# Patient Record
Sex: MALE | Race: WHITE | Employment: FULL TIME | URBAN - METROPOLITAN AREA
[De-identification: names, ages, dates, MRNs, and addresses within clinical notes are randomized per-mention and may not be internally consistent; named-entity substitution may affect disease eponyms.]

---

## 2023-03-31 ENCOUNTER — APPOINTMENT (OUTPATIENT)
Dept: GENERAL RADIOLOGY | Age: 38
End: 2023-03-31
Payer: COMMERCIAL

## 2023-03-31 ENCOUNTER — HOSPITAL ENCOUNTER (EMERGENCY)
Age: 38
Discharge: HOME | End: 2023-04-01
Attending: EMERGENCY MEDICINE
Payer: COMMERCIAL

## 2023-03-31 VITALS
BODY MASS INDEX: 40.43 KG/M2 | OXYGEN SATURATION: 99 % | WEIGHT: 315 LBS | DIASTOLIC BLOOD PRESSURE: 126 MMHG | HEIGHT: 74 IN | TEMPERATURE: 98.7 F | HEART RATE: 95 BPM | RESPIRATION RATE: 18 BRPM | SYSTOLIC BLOOD PRESSURE: 156 MMHG

## 2023-03-31 DIAGNOSIS — M25.551 RIGHT HIP PAIN: Primary | ICD-10-CM

## 2023-03-31 PROCEDURE — 99283 EMERGENCY DEPT VISIT LOW MDM: CPT

## 2023-03-31 PROCEDURE — 73502 X-RAY EXAM HIP UNI 2-3 VIEWS: CPT

## 2023-03-31 ASSESSMENT — PAIN DESCRIPTION - PAIN TYPE: TYPE: ACUTE PAIN

## 2023-03-31 ASSESSMENT — PAIN SCALES - GENERAL: PAINLEVEL_OUTOF10: 4

## 2023-03-31 ASSESSMENT — PAIN - FUNCTIONAL ASSESSMENT
PAIN_FUNCTIONAL_ASSESSMENT: 0-10
PAIN_FUNCTIONAL_ASSESSMENT: ACTIVITIES ARE NOT PREVENTED

## 2023-03-31 ASSESSMENT — PAIN DESCRIPTION - LOCATION: LOCATION: HIP

## 2023-03-31 ASSESSMENT — PAIN DESCRIPTION - ORIENTATION: ORIENTATION: RIGHT

## 2023-03-31 ASSESSMENT — PAIN DESCRIPTION - FREQUENCY: FREQUENCY: CONTINUOUS

## 2023-03-31 ASSESSMENT — PAIN DESCRIPTION - ONSET: ONSET: ON-GOING

## 2023-03-31 ASSESSMENT — PAIN DESCRIPTION - DESCRIPTORS: DESCRIPTORS: ACHING

## 2023-03-31 NOTE — ED TRIAGE NOTES
Patient arrived at the ER with c/o  work injury. Patient was at work when he put on boot cover. He slip and fell on concert around 4:48. Patient has right hip pain. CONGESTION/RHINORRHEA

## 2023-03-31 NOTE — Clinical Note
Kalen Hicks was seen and treated in our emergency department on 3/31/2023. He may return to work on 04/05/2023. If you have any questions or concerns, please don't hesitate to call.       Yesy Hodge MD

## 2023-03-31 NOTE — Clinical Note
Pauline Bell was seen and treated in our emergency department on 3/31/2023. He may return to work on 04/05/2023. If you have any questions or concerns, please don't hesitate to call.       Roselia Mcclure MD

## 2023-04-01 ASSESSMENT — ENCOUNTER SYMPTOMS
VOMITING: 0
NAUSEA: 0
ABDOMINAL PAIN: 0
SHORTNESS OF BREATH: 0
COUGH: 0
BACK PAIN: 0

## 2023-04-01 NOTE — DISCHARGE INSTRUCTIONS
You were seen at the [facility] ED for right hip pain after a fall. Your X-ray is normal. You can manage your symptoms at home with medications like Tylenol and Ibuprofen. Please return to the ED if you experience chest pain, trouble breathing, or any other symptoms that concern you.

## 2023-04-01 NOTE — ED NOTES
Patient prepared for and ready to be discharged. Patient discharged at this time in no acute distress after verbalizing understanding of discharge instructions. Patient left after receiving After Visit Summary instructions.       Darleen Sena RN  04/01/23 [id number]

## 2023-04-01 NOTE — ED PROVIDER NOTES
ED Attending Attestation Note     Date of evaluation: 3/31/2023    This patient was seen by the resident. I have seen and examined the patient, agree with the workup, evaluation, management and diagnosis. The care plan has been discussed. My assessment reveals a pleasant 20-year-old male who presents to the emerged department with right hip pain. Patient suffered a mechanical slip and fall at work earlier today. He attributes this to his new mandated foot booties that he has to wear. Patient states he did not strike his head or lose consciousness. He was able to bear weight on this extremity following the fall but he continued to have right hip pain prompting his visit here today. On examination I find an adult male, speaking in complete sentences. He has no increased work of breathing or accessory muscle use during respiration. His abdomen is soft, nontender to palpation. He has mild tenderness palpation over his lateral right hip with no pain with logroll. Neurovascular intact distally. X-rays without any signs of fracture or dislocation. Will discharge with symptomatic management using Tylenol ibuprofen and instructions to follow-up as needed. Pito Gil MD MPH   Physician.         Swapnil Garcia MD  03/31/23 [id number]

## 2023-04-01 NOTE — ED PROVIDER NOTES
.  [address] ENCOUNTER          EM RESIDENT NOTE       Date of evaluation: 3/31/2023    Chief Complaint     Fall, Work Related Injury, and Hip Pain (Right hip)      History of Present Illness     Elisha Baird is a 45 y.o. male who presents with right hip pain following a fall. Patient reports that while he was at work this afternoon, he slipped on a slippery surface, and fell onto his right hip. He was able to stand up and ambulate following his fall without issue, but has been experiencing intermittent discomfort in his right hip since that time. He denies hitting his head or losing consciousness during his fall, or striking any other part of his body on the ground. He reports that his pain is somewhat worsened with ambulation, but does not limit his movement of the right lower extremity. He denies associated numbness, tingling, or weakness in his right lower extremity. He is otherwise in his normal state of health, and denies any preceding chest pain, shortness of breath, dizziness, lightheadedness preceding the fall. MEDICAL DECISION MAKING / ASSESSMENT / PLAN     INITIAL VITALS: BP: (!) 156/126, Temp: 98.7 °F (37.1 °C), Heart Rate: 95, Resp: 18, SpO2: 99 %    Elisha Baird is a 45 y.o. male who presents with right hip pain following a mechanical fall earlier today. Upon presentation, vital signs remarkable for hypertension with a blood pressure of 156/126, but were otherwise within normal limits. On exam, patient is well-appearing. He does not have significant bony tenderness to palpation of the right hip, and no apparent neurovascular deficits of his bilateral lower extremities. Right hip x-ray obtained in triage was unremarkable. Patient symptoms are most likely secondary to soft tissue injury following his fall. Patient was offered prescriptions for Tylenol, ibuprofen, lidocaine patches, but he declined stating that he has these medications at home.   Paperwork was vomiting. Musculoskeletal:  Negative for back pain, joint swelling and neck pain. Skin:  Negative for rash and wound. Neurological:  Negative for dizziness, syncope, weakness, light-headedness, numbness and headaches. Past Medical, Surgical, Family, and Social History     He has no past medical history on file. He has no past surgical history on file. His family history is not on file. He reports that he has been smoking cigarettes. He does not have any smokeless tobacco history on file. He reports current alcohol use. Medications     There are no discharge medications for this patient. Allergies     He has No Known Allergies. Physical Exam     INITIAL VITALS: BP: (!) 156/126, Temp: 98.7 °F (37.1 °C), Heart Rate: 95, Resp: 18, SpO2: 99 %   Physical Exam  Constitutional:       General: He is not in acute distress. HENT:      Head: Normocephalic and atraumatic. Right Ear: External ear normal.      Left Ear: External ear normal.      Nose: Nose normal.      Mouth/Throat:      Mouth: Mucous membranes are moist.   Eyes:      Conjunctiva/sclera: Conjunctivae normal.   Cardiovascular:      Rate and Rhythm: Normal rate and regular rhythm. Pulses: Normal pulses. Heart sounds: Normal heart sounds. Pulmonary:      Effort: Pulmonary effort is normal.      Breath sounds: Normal breath sounds. Abdominal:      General: Abdomen is flat. Palpations: Abdomen is soft. Musculoskeletal:         General: No swelling, tenderness or deformity. Normal range of motion. Cervical back: Neck supple. No rigidity. Skin:     General: Skin is warm and dry. Capillary Refill: Capillary refill takes less than 2 seconds. Neurological:      General: No focal deficit present. Mental Status: He is alert and oriented to person, place, and time.    Psychiatric:         Mood and Affect: Mood normal.         Behavior: Behavior normal.              Carline Ochoa MD  Resident  04/01/23